# Patient Record
Sex: MALE | Race: WHITE | NOT HISPANIC OR LATINO
[De-identification: names, ages, dates, MRNs, and addresses within clinical notes are randomized per-mention and may not be internally consistent; named-entity substitution may affect disease eponyms.]

---

## 2023-08-23 ENCOUNTER — NON-APPOINTMENT (OUTPATIENT)
Age: 58
End: 2023-08-23

## 2023-08-23 ENCOUNTER — APPOINTMENT (OUTPATIENT)
Dept: PULMONOLOGY | Facility: CLINIC | Age: 58
End: 2023-08-23
Payer: COMMERCIAL

## 2023-08-23 VITALS
SYSTOLIC BLOOD PRESSURE: 110 MMHG | DIASTOLIC BLOOD PRESSURE: 80 MMHG | BODY MASS INDEX: 39.76 KG/M2 | WEIGHT: 300 LBS | HEART RATE: 75 BPM | HEIGHT: 73 IN

## 2023-08-23 DIAGNOSIS — I10 ESSENTIAL (PRIMARY) HYPERTENSION: ICD-10-CM

## 2023-08-23 DIAGNOSIS — R06.83 SNORING: ICD-10-CM

## 2023-08-23 DIAGNOSIS — Z78.9 OTHER SPECIFIED HEALTH STATUS: ICD-10-CM

## 2023-08-23 DIAGNOSIS — E66.9 OBESITY, UNSPECIFIED: ICD-10-CM

## 2023-08-23 PROBLEM — Z00.00 ENCOUNTER FOR PREVENTIVE HEALTH EXAMINATION: Status: ACTIVE | Noted: 2023-08-23

## 2023-08-23 PROCEDURE — 99203 OFFICE O/P NEW LOW 30 MIN: CPT

## 2023-08-23 RX ORDER — AMLODIPINE BESYLATE 5 MG/1
TABLET ORAL
Refills: 0 | Status: ACTIVE | COMMUNITY

## 2023-08-23 RX ORDER — LOSARTAN POTASSIUM 100 MG/1
TABLET, FILM COATED ORAL
Refills: 0 | Status: ACTIVE | COMMUNITY

## 2023-08-23 NOTE — ASSESSMENT
[FreeTextEntry1] : 57-year-old man with history of snoring and high STOP-BANG score of 6 most likely has sleep apnea.  Discussed about procedure and treatment options with the patient.

## 2023-08-23 NOTE — PHYSICAL EXAM
[General Appearance - Well Developed] : well developed [General Appearance - Well Nourished] : well nourished [Enlarged Base of the Tongue] : enlargement of the base of the tongue [IV] : IV [Heart Sounds] : normal S1 and S2 [Murmurs] : no murmurs [] : no respiratory distress [Auscultation Breath Sounds / Voice Sounds] : lungs were clear to auscultation bilaterally [No Focal Deficits] : no focal deficits [Oriented To Time, Place, And Person] : oriented to person, place, and time [Memory Recent] : recent memory was not impaired [FreeTextEntry1] : no edema

## 2023-08-23 NOTE — HISTORY OF PRESENT ILLNESS
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  Patient is not sleepy with Bishop sleepiness score of 5.  Patient has very loud snoring which disturbs other people, does not have any witnessed apneas.  Patient's bedtime is around 10 PM wakes up in the morning around 7 AM.   He feels rested when he wakes up.  Patient drinks 1 cup of coffee during the daytime. Patient does not have any headaches or nocturia. He is not sleepy while driving. STOPBANG score - 6 neck size - 19 and half inches

## 2025-01-06 ENCOUNTER — NON-APPOINTMENT (OUTPATIENT)
Age: 60
End: 2025-01-06

## 2025-01-06 ENCOUNTER — APPOINTMENT (OUTPATIENT)
Dept: VASCULAR SURGERY | Facility: CLINIC | Age: 60
End: 2025-01-06
Payer: COMMERCIAL

## 2025-01-06 VITALS
SYSTOLIC BLOOD PRESSURE: 145 MMHG | HEIGHT: 73 IN | BODY MASS INDEX: 38.43 KG/M2 | DIASTOLIC BLOOD PRESSURE: 87 MMHG | HEART RATE: 77 BPM | WEIGHT: 290 LBS

## 2025-01-06 DIAGNOSIS — I82.811 EMBOLISM AND THROMBOSIS OF SUPERFICIAL VEINS OF RIGHT LOWER EXTREMITY: ICD-10-CM

## 2025-01-06 DIAGNOSIS — I83.891 VARICOSE VEINS OF RIGHT LOWER EXTREMITY WITH OTHER COMPLICATIONS: ICD-10-CM

## 2025-01-06 DIAGNOSIS — I87.2 VENOUS INSUFFICIENCY (CHRONIC) (PERIPHERAL): ICD-10-CM

## 2025-01-06 PROCEDURE — 99204 OFFICE O/P NEW MOD 45 MIN: CPT

## 2025-01-06 PROCEDURE — 93971 EXTREMITY STUDY: CPT | Mod: RT

## 2025-01-07 PROBLEM — I87.2 VENOUS INSUFFICIENCY OF RIGHT LOWER EXTREMITY: Status: ACTIVE | Noted: 2025-01-06

## 2025-01-07 PROBLEM — I83.891 SYMPTOMATIC VARICOSE VEINS OF RIGHT LOWER EXTREMITY: Status: ACTIVE | Noted: 2025-01-06

## 2025-01-08 PROBLEM — I82.811 ACUTE SUPERFICIAL VENOUS THROMBOSIS OF RIGHT LOWER EXTREMITY: Status: ACTIVE | Noted: 2025-01-08

## 2025-03-10 ENCOUNTER — APPOINTMENT (OUTPATIENT)
Dept: VASCULAR SURGERY | Facility: CLINIC | Age: 60
End: 2025-03-10
Payer: COMMERCIAL

## 2025-03-10 VITALS — DIASTOLIC BLOOD PRESSURE: 85 MMHG | SYSTOLIC BLOOD PRESSURE: 156 MMHG | HEART RATE: 59 BPM

## 2025-03-10 VITALS — HEART RATE: 62 BPM | DIASTOLIC BLOOD PRESSURE: 91 MMHG | SYSTOLIC BLOOD PRESSURE: 153 MMHG

## 2025-03-10 DIAGNOSIS — I87.2 VENOUS INSUFFICIENCY (CHRONIC) (PERIPHERAL): ICD-10-CM

## 2025-03-10 DIAGNOSIS — I83.891 VARICOSE VEINS OF RIGHT LOWER EXTREMITY WITH OTHER COMPLICATIONS: ICD-10-CM

## 2025-03-10 PROCEDURE — 36475 ENDOVENOUS RF 1ST VEIN: CPT | Mod: RT

## 2025-03-17 ENCOUNTER — APPOINTMENT (OUTPATIENT)
Dept: VASCULAR SURGERY | Facility: CLINIC | Age: 60
End: 2025-03-17

## 2025-03-17 DIAGNOSIS — I87.2 VENOUS INSUFFICIENCY (CHRONIC) (PERIPHERAL): ICD-10-CM

## 2025-03-17 DIAGNOSIS — I83.891 VARICOSE VEINS OF RIGHT LOWER EXTREMITY WITH OTHER COMPLICATIONS: ICD-10-CM

## 2025-03-17 PROCEDURE — 93971 EXTREMITY STUDY: CPT | Mod: RT

## 2025-03-17 PROCEDURE — 99024 POSTOP FOLLOW-UP VISIT: CPT
